# Patient Record
Sex: FEMALE | Race: OTHER
[De-identification: names, ages, dates, MRNs, and addresses within clinical notes are randomized per-mention and may not be internally consistent; named-entity substitution may affect disease eponyms.]

---

## 2019-01-21 ENCOUNTER — HOSPITAL ENCOUNTER (OUTPATIENT)
Dept: HOSPITAL 46 - DS | Age: 54
Discharge: HOME | End: 2019-01-21
Attending: UROLOGY
Payer: COMMERCIAL

## 2019-01-21 VITALS — BODY MASS INDEX: 21.97 KG/M2 | WEIGHT: 139.99 LBS | HEIGHT: 67 IN

## 2019-01-21 DIAGNOSIS — N13.5: ICD-10-CM

## 2019-01-21 DIAGNOSIS — N30.80: ICD-10-CM

## 2019-01-21 DIAGNOSIS — Z79.899: ICD-10-CM

## 2019-01-21 DIAGNOSIS — F41.9: ICD-10-CM

## 2019-01-21 DIAGNOSIS — N20.1: Primary | ICD-10-CM

## 2019-01-21 DIAGNOSIS — B96.20: ICD-10-CM

## 2019-01-21 DIAGNOSIS — I10: ICD-10-CM

## 2019-01-21 DIAGNOSIS — E03.9: ICD-10-CM

## 2019-01-21 PROCEDURE — C2617 STENT, NON-COR, TEM W/O DEL: HCPCS

## 2019-01-21 PROCEDURE — 0T778DZ DILATION OF LEFT URETER WITH INTRALUMINAL DEVICE, VIA NATURAL OR ARTIFICIAL OPENING ENDOSCOPIC: ICD-10-PCS | Performed by: UROLOGY

## 2019-01-21 NOTE — NUR
01/21/19 0826 Stephane,Kesha
0815 PT ARRIVED TO PACU ON 6L VIA MASK, RESP EVEN AND UNLABORED. PT
NONAROUSABLE TO TACTILE STIMULI. SMALL AMOUNT OF SNORING NOTED.
 
0821 PT WOKE TO TACTIEL STIMULI AND STARTS MOANING, PT REPORT "LITTLE
BIT" OF PAIN. PT BACK TO SLEEP. PT REORIENTED TO PACU.
 
0826 O2 REMOVED O2 SAT 99%.

## 2019-01-21 NOTE — NUR
IN TO CHECK ON PT, PT STATES SHE IS FEELING BETTER. CRACKERS GIVEN. NO FURTHER
NEEDS AT THIS TIME.  AT BEDSIDE, CALL LIGHT IN REACH.

## 2019-01-21 NOTE — NUR
1040 IN TO CHECK ON PT, VITALS STABLE. PUDDING GIVEN PER REQUEST. DISCUSSED
DISCHARGE WITH  TRANSLATING FOR PT. PT WILL CALL WHEN READY.

## 2019-01-21 NOTE — NUR
0940 IN TO CHECK ON PT, TOLERATING PO WELL. PT UP WITH ASSIST FROM RN TO
BATHROOM. PT DIZZY AND SLIGHTLY NAUSEATED. ASSISTED BACK TO BED. WATER GIVEN.
OFFERED CRACKERS, PT DECLINED. DISCUSSED TAKING PO PAIN MEDICATION WHEN PT IS
NOT NAUSEATED. WATER REFILLED.  TRANSLATING AT BEDSIDE. CALL LIGHT IN
REACH.

## 2019-01-22 NOTE — OR
[facility]
                                    [address]
                                  [place], [place]  [address]
_________________________________________________________________________________________
                                                                 Signed   
 
 
DATE OF OPERATION:
01/21/2019
 
SURGEON:
Ashley Farmer MD
 
PREOPERATIVE DIAGNOSES:
1. An 11 mm left ureteropelvic junction calculus.
2. Extended-spectrum beta-lactamase urinary tract infection.
 
POSTOPERATIVE DIAGNOSES:
1. An 11 mm left ureteropelvic junction calculus.
2. Extended-spectrum beta-lactamase urinary tract infection.
 
PROCEDURES PERFORMED:
1. Diagnostic cystoscopy.
2. Insertion of an indwelling left ureteral stent.
 
ANESTHESIA:
General.
 
ESTIMATED BLOOD LOSS:
None.
 
COMPLICATIONS:
None.
 
SPECIMENS:
None.
 
DRAINS:
A 6 x 26 cm contour double-J ureteral stent inserted into the left collecting system.
 
INDICATIONS FOR PROCEDURE:
"Sivan" is a very pleasant 53-year-old female with past medical history significant for
nephrolithiasis and recurrent urinary tract infection.  She is a former patient of Dr. Heller, who performed ureteroscopy with left ureteral stone extraction a couple of years
ago.  Dr. Heller determined that the cause of her recurrent stones on the left side is due
to a left ureteropelvic junction obstruction, which has since been confirmed on repeat
imaging studies.  The patient recently presented to me for evaluation of an 11 mm left
renal calculus.  I explained to the patient that I would be glad to extract the
calculus.  However, she will continue to have repeated issues with stones and infection
 
    Electronically Signed By: ASHLEY FARMER MD  01/22/19 1921
_________________________________________________________________________________________
[facility]     [facility] [id number]            OPERATIVE REPORT              
          ACCT #: [id number]  
DATE OF BIRTH:   [birth year]            REPORT #: [id number]      
PHYSICIAN:        ASHLEY FARMER MD               
PCP:              BRAYDEN WAGGONER MD             
REPORT IS CONFIDENTIAL AND NOT TO BE RELEASED WITHOUT AUTHORIZATION
 
 
                                  [facility]
                                    [address], [place]  [address]
_________________________________________________________________________________________
                                                                 Signed   
 
 
until she undergoes definitive management of her left ureteropelvic junction
obstruction.  Dr. Heller did attempt to dilate the UPJ obstruction.  However, the UPJ
obstruction did not fully resolve.  Three days ago, her urine culture results came back
revealing that she had an ESBL E. coli UTI.  Unfortunately, she was unable to obtain the
antibiotics in time for today's procedure.  The decision was, therefore, made for her to
undergo cystoscopy with left ureteral stent insertion today, in preparation for
definitive left ureteroscopy with laser lithotripsy, basket extraction of stones, and
left ureteral stent exchange next week.  The patient would prefer to have the stone
removed now and then be sent out for referral to be evaluated for robotic left
pyeloplasty for definitive management of her left ureteropelvic junction obstruction. 
 
OPERATIVE FINDINGS:
1. On cystoscopy, there was no evidence of any suspicious masses, lesions, or stones.
There is a diffuse, but mild amount of cystitis cystica present.  Bilateral ureteral
orifices are in their normal anatomic location.  There is no evidence of obvious
cystocele or other pelvic organ prolapse on exam. 
2. A 6 x 26 cm double-J ureteral stent was inserted into the left ureter under direct
visualization without difficulty. 
 
DESCRIPTION OF PROCEDURE:
After informed consent was obtained, the patient was taken back to the operating room.
She was transferred from the [facility] to the operating room table, where general
anesthesia was induced.  She was placed in the dorsal lithotomy position and her
genitalia prepped and draped in a standard sterile fashion.  Using a 30-degree lens on a
22.5-Dominican introducer, rigid cystoscope was inserted through the urethra into her
bladder under direct visualization.  Panendoscopic views of the bladder were then
obtained including the lateral walls, floor, dome, and trigone areas.  Please see above
findings.  Attention was then turned to the left ureteral orifice.  A 0.035 Sensor wire
was inserted into the left UO and up into the left collecting system.  There was a
moderate amount of debris that drained from the left collecting system with the
insertion of the Sensor wire, which is indicative of pyuria.  With the wire in adequate
position via fluoroscopy, a 6 x 26 stent was passed over the wire and into the left
collecting system under direct visualization.  The wire was pulled and a loose coil was
seen in the left collecting system along with an adequate coil within the bladder on
cystoscopy.  The patient's bladder was then drained and the cystoscope was removed.  The
procedure was then terminated.  The patient tolerated the procedure well without any
complication.  She will now be transferred to the Postanesthesia Care Unit in stable
condition. 
 
DISPOSITION:
I discussed the details of the procedure with her daughter today and answered all of her
questions.  Her daughter has been instructed to go  the Augmentin that is waiting
 
    Electronically Signed By: ASHLEY FARMER MD  01/22/19 1921
_________________________________________________________________________________________
PATIENT NAME:     TRAVON BLAKEVIGES           
MEDICAL RECORD #: [id number]            OPERATIVE REPORT              
          ACCT #: [id number]  
DATE OF BIRTH:   [birth year]            REPORT #: [id number]      
PHYSICIAN:        ASHLEY FARMER MD               
PCP:              BRAYDEN WAGGONER MD             
REPORT IS CONFIDENTIAL AND NOT TO BE RELEASED WITHOUT AUTHORIZATION
 
 
                                  [address]  [address]
_________________________________________________________________________________________
                                                                 Signed   
 
 
for her at the [facility] in [place] for her to begin taking this afternoon and for the
rest of the week.  Her recent culture showing the ESBL E. coli UTI shows that this
organism is sensitive to Augmentin.  She will be scheduled to return to the operating
room in exactly one week to undergo left ureteroscopy, laser lithotripsy, basket
extraction of stones, and left ureteral stent exchange. 
 
 
 
            ________________________________________
            MD MAYO Flores/JOSE JUANL
Job #:  [id number]
DD:  01/21/2019 08:50:18
DT:  01/21/2019 17:59:34
 
 
Copies:                                
~
 
 
 
 
 
 
 
 
 
 
 
 
 
 
 
 
 
 
 
 
 
 
 
    Electronically Signed By: ASHLEY FARMER MD  01/22/19 1921
_________________________________________________________________________________________
PATIENT NAME:     EMY LINDA GUARDADO           
MEDICAL RECORD #: [id number]            OPERATIVE REPORT              
          ACCT #: [id number]  
DATE OF BIRTH:   [birth year]            REPORT #: [id number]      
PHYSICIAN:        ASHLEY FARMER MD               
PCP:              BRAYDEN WAGGONER MD             
REPORT IS CONFIDENTIAL AND NOT TO BE RELEASED WITHOUT AUTHORIZATION

## 2019-01-28 ENCOUNTER — HOSPITAL ENCOUNTER (OUTPATIENT)
Dept: HOSPITAL 46 - OPS | Age: 54
Discharge: HOME | End: 2019-01-28
Attending: UROLOGY
Payer: COMMERCIAL

## 2019-01-28 VITALS — BODY MASS INDEX: 21.97 KG/M2 | HEIGHT: 67 IN | WEIGHT: 139.99 LBS

## 2019-01-28 DIAGNOSIS — Z87.442: ICD-10-CM

## 2019-01-28 DIAGNOSIS — N39.0: ICD-10-CM

## 2019-01-28 DIAGNOSIS — B96.29: ICD-10-CM

## 2019-01-28 DIAGNOSIS — E03.9: ICD-10-CM

## 2019-01-28 DIAGNOSIS — N13.5: ICD-10-CM

## 2019-01-28 DIAGNOSIS — Z79.899: ICD-10-CM

## 2019-01-28 DIAGNOSIS — N20.0: Primary | ICD-10-CM

## 2019-01-28 DIAGNOSIS — F41.9: ICD-10-CM

## 2019-01-28 DIAGNOSIS — Z98.890: ICD-10-CM

## 2019-01-28 DIAGNOSIS — I10: ICD-10-CM

## 2019-01-28 DIAGNOSIS — Z79.2: ICD-10-CM

## 2019-01-28 PROCEDURE — 0TP98DZ REMOVAL OF INTRALUMINAL DEVICE FROM URETER, VIA NATURAL OR ARTIFICIAL OPENING ENDOSCOPIC: ICD-10-PCS | Performed by: UROLOGY

## 2019-01-28 PROCEDURE — BT1FYZZ FLUOROSCOPY OF LEFT KIDNEY, URETER AND BLADDER USING OTHER CONTRAST: ICD-10-PCS | Performed by: UROLOGY

## 2019-01-28 NOTE — NUR
01/28/19 1312 Kesha Calderón
1300 PT ARRIVED TO PACU ON 6L VIA MASK, RESP EVEN AND UNLABORED. PT
REACTIVE TO VERBAL STIMULI.
 
1307 O2 MASK REMOVED.
 
1311 PT REPORTS 2/10 PAIN AND TOLERABLE AT THIS TIME, PT SPITTING UP
SPUTUM AND SUCTION USED. PT CONTINUES TO REST IN BED WITH EYES CLOSED.

## 2019-01-28 NOTE — NUR
PT INDICATES THAT SHE WOULD LIKE TO GO HOME AT THIS TIME. SHE HAS MET DC
CRITERIA. SHE HAS BEEN ABLE TO KEEP JELLO AND WATER DOWN WITHOU ISSUES. SHE I
STILL C/O PHLEGM IN HER CHEST, IT IS SUGGESTED THAT THEY GET OVER THE COUNTER
MUCINEX TO HELP THIN IT OUT SO SHE CAN COUGH IT UP. NO ADDITIONAL NEEDS AT THI
TIME. WILL GET THE PT READY TO GO HOME.

## 2019-01-28 NOTE — NUR
PT IS BACK TO DS FROM PACU. DR. FARMER IS IN THE ROOM TALKING WITH FAMILY.
 AND DAUGHTER ARE AT THE BEDSIDE. CALL LIGHT WITHIN IN REACH. NO
ADDIIONAL NEEDS AT THIS TIME. WILL CONINUE TO MONITOR.

## 2019-01-28 NOTE — NUR
PT REPORTS BEING IN A "LITTLE PAIN" RAITING IT A 5 OR 6 OUT OF 10. SHE IS
ASKED IF SHE WOULD LIKE ANYTHING FOR THE PAIN, SHE STATES THAT SHE DOESN'T
WANT ANYTHING RIGHT NOW. SHE IS GIVEN ICE WATER.

## 2019-01-28 NOTE — NUR
LE 1430: PT REQUESTING TO GET UP AND USE THE RESTROOM. PT IS DIZZY UPON
SITTING UP ON THE EDGE OF THE BED. BEDSIDE COMMODE IS BROUGHT INTO THE ROOM.
SHE IS ABLE TO VOID QS, YELLOW/PINKISH URINE. SHE REPORTS SOME PAIN IN HER
LOWER ABDOMEN, BUT DOES NOT WANT PAIN MEDICINE AT THIS TIME. THE PT IS C/O
FEELING LIKE SHE HAS A LOT OF PHLEGM STUCK IN HER THROAT, SHE IS GIVEN AN
EMESIS BACK TO SPIT IN. SHE STATES THAT IT FEELS BETTER WHEN SHE IS SITTING
UP, SHE IS CURRENTLY SITTING ON THE EDGE OF THE BED.  IS AT THE
BEDSIDE. CALL LIGHT WITHIN REACH. NO ADDITIONAL NEEDS AT THIS TIME. PT STATES
THAT HER DIZZINESS IS SUBSIDING AT THIS TIME. WILL CONTINUE TO MONITOR.

## 2019-01-30 NOTE — OR
[facility]
                                    [address] [place], [place]  [address]
_________________________________________________________________________________________
                                                                 Signed   
 
 
DATE OF OPERATION:
01/28/2019
 
SURGEON:
Ashley Farmer MD
 
PREOPERATIVE DIAGNOSES:
1. Recurrent urinary tract infection.
2. Recent active extended spectrum beta-lactamase Escherichia coli urinary tract
infection. 
3. Known 1.2 cm left renal calculus.
4. History of left ureteropelvic junction obstruction.
 
POSTOPERATIVE DIAGNOSES:
1. Recurrent urinary tract infection.
2. Recent active extended spectrum beta-lactamase Escherichia coli urinary tract
infection. 
3. Known 1.2 cm left renal calculus.
4. History of left ureteropelvic junction obstruction.
5. Severe, tortuous, left ureteropelvic junction obstruction.
 
NAMES OF PROCEDURES:
1. Diagnostic cystoscopy with left ureteral stent extraction.
2. Left retrograde pyelogram.
 
ANESTHESIA:
General.
 
ESTIMATED BLOOD LOSS:
None.
 
COMPLICATIONS:
None.
 
SPECIMENS:
None.
 
DRAINS:
None.
 
INDICATIONS FOR PROCEDURE:
"Sivan" is a very pleasant 53-year-old female with a history of nephrolithiasis and
 
    Electronically Signed By: ASHLEY FARMER MD  01/30/19 1331
_________________________________________________________________________________________
PATIENT NAME:     EMY LINDA GUARDADO           
MEDICAL RECORD #: [id number]            OPERATIVE REPORT              
          ACCT #: [id number]  
DATE OF BIRTH:   [birth year]            REPORT #: [id number]      
PHYSICIAN:        ASHLEY FARMER MD               
PCP:              BRAYDEN WAGGONER MD             
REPORT IS CONFIDENTIAL AND NOT TO BE RELEASED WITHOUT AUTHORIZATION
 
 
                                  [facility]
                                    [address] [place] [address]
                                  [place], [place]  [address]
_________________________________________________________________________________________
                                                                 Signed   
 
 
recurrent urinary tract infections.  She presented to me in late 2018 after being found
to have a 1.2 cm stone in the left renal pelvis.  She has a history of nephrolithiasis
and underwent an endopyelotomy by Dr. Heller in approximately 2017.  She has been
experiencing repeated stones within the left kidney, likely secondary to her
ureteropelvic junction obstruction.  She recently presented requesting extraction of her
stone as it does cause her discomfort.  Since her request for surgery, I have treated
her urine multiple times for ESBL Escherichia coli.  She was initially slated to undergo
surgery about a week ago; however, she was unable to take her oral antibiotics in time
and instead she underwent left ureteral stent insertion.  She presents today to undergo
definitive management of her stone.  She understands that she will continue to make
stones until her ureteropelvic junction obstruction is repaired.  Despite this, she is
interested in having the kidney stone removed. 
 
OPERATIVE FINDINGS:
1. The patient's indwelling left ureteral stent was removed fully intact today.
2. A left retrograde pyelogram was performed, which revealed a very tight and tortuous
ureteropelvic junction obstruction that was approximately 2-3 cm in length.  I was
unable to pass the ureteroscope into this very tight dense scar tissue.  Multiple
attempts were made to pass a 0.035 Sensor wire through the stricture and up into the
left renal pelvis.  However, this was also unsuccessful.  Unfortunately, at that time I
did not have any glide wires or any other smaller wires to attempt to pass through the
obstruction and into the left renal pelvis.  I was therefore unable to successfully pass
the stent back into the left collecting system as I would have wished to do. 
3. Also noted on the left retrograde pyelogram was definite dilation of the left renal
pelvis along with blunting of the calices, consistent with chronic diminished drainage
on the left side due to this ureteropelvic junction obstruction.  It appears as though
the endopyelotomy that was performed a few years ago by Dr. Heller has failed.  Left
retrograde pyelogram does reveal the large filling defect consistent with her large 1.2
cm stone within the left renal pelvis. 
 
DESCRIPTION OF PROCEDURE:
After informed consent was obtained, the patient was taken back to the operating room.
She was transferred from the [facility] to the operating room table, where general
anesthesia was induced.  She was placed in the dorsal lithotomy position and her
genitalia prepped and draped in the standard sterile fashion.  Using a 30-degree lens on
a 22.5 French introducer, rigid cystoscope was inserted through urethra into her bladder
under direct visualization.  Panendoscopic views of bladder were then obtained.  The
current indwelling left ureteral stent was removed fully intact.  I then advanced a
cone-tipped catheter to the left ureteral orifice and a left retrograde pyelogram was
performed.  Please see above findings.  It was then that I noted that the severity of
her ureteropelvic junction obstruction on the left side and I made several attempts to
pass a 0.035 Sensor wire into the left collecting system, however due to the tortuosity
 
    Electronically Signed By: ASHLEY FARMER MD  01/30/19 1331
_________________________________________________________________________________________
PATIENT NAME:     EMY LINDA GUARDADO           
MEDICAL RECORD #: [id number]            OPERATIVE REPORT              
          ACCT #: [id number]  
DATE OF BIRTH:   [birth year]            REPORT #: [id number]      
PHYSICIAN:        ASHLEY FARMER MD               
PCP:              BRAYDEN WAGGONER MD             
REPORT IS CONFIDENTIAL AND NOT TO BE RELEASED WITHOUT AUTHORIZATION
 
 
                                  [address]  [address]
_________________________________________________________________________________________
                                                                 Signed   
 
 
and stenotic nature of the obstruction, I was unable to pass a Sensor wire.
Unfortunately, I did not have any other types of thin or hydrophilic wires to attempt to
pass into the left renal pelvis.  I therefore aborted my attempts to re-stent the
patient.  Thinking back to my previous stent placement, it was likely that the proximal
coil was placed in the proximal ureter.  The decision was made to not perform a
retrograde pyelogram last week due to the presence of active ESBL E coli infection at
that time.  At this point in time, the decision was made to end the procedure.  The
patient tolerated the procedure well without any complication.  She will now be
transferred to the postanesthesia care unit in stable condition. 
 
DISPOSITION:
I discussed the details of what I saw on retrograde pyelogram today with the patient's
 and her daughter and answered all of her questions.  I have made it very clear
to them that the patient has a rather severe and long proximal ureteral
stricture/ureteropelvic junction obstruction that can only be definitively repaired via
robotic pyeloplasty.  I have recommended that she be sent to Dr. Moses Colón at
[facility] in [place] for definitive repair.  I have asked that a formal
referral be placed for this purpose.  In the interim, she will be given antibiotics as
needed for recurrent ESBL UTIs.  I have made it clear to the family today that she will
continue to have recurrent UTIs and flank pain on the left side until her UPJ
obstruction can be formally repaired.  At that time, during the robotic pyeloplasty the
surgeon will be able to extract the stone from the left renal pelvis so that both issues
can be corrected at once.  The patient's family verbalized understanding of this today
and agree with the current plan of care.  She will be scheduled to follow up with me in
approximately six weeks in clinic for urine check and to monitor her discomfort.  In the
interim, she will be seen by Dr. Colón to be evaluated for robotic pyeloplasty on
the left side. 
 
 
 
            ________________________________________
            MD MAYO Flores/ANTONETTE
Job #:  [id number]
DD:  01/29/2019 19:53:59
DT:  01/29/2019 23:26:13
 
 
Copies:                                
 
 
    Electronically Signed By: ASHLEY FARMER MD  01/30/19 1331
_________________________________________________________________________________________
PATIENT NAME:     LINDA BLAKE           
MEDICAL RECORD #: [id number]            OPERATIVE REPORT              
          ACCT #: [id number]  
DATE OF BIRTH:   [birth year]            REPORT #: [id number]      
PHYSICIAN:        ASHLEY FARMER MD               
PCP:              BRAYDEN WAGGONER MD             
REPORT IS CONFIDENTIAL AND NOT TO BE RELEASED WITHOUT AUTHORIZATION
 
 
                                  [address]  [address]
_________________________________________________________________________________________
                                                                 Signed   
 
 
~
 
 
 
 
 
 
 
 
 
 
 
 
 
 
 
 
 
 
 
 
 
 
 
 
 
 
 
 
 
 
 
 
 
 
 
 
 
 
 
 
 
 
    Electronically Signed By: ASHLEY FARMER MD  01/30/19 1331
_________________________________________________________________________________________
PATIENT NAME:     EMY LINDA GUARDADO           
MEDICAL RECORD #: [id number]            OPERATIVE REPORT              
          ACCT #: [id number]  
DATE OF BIRTH:   [birth year]            REPORT #: [id number]      
PHYSICIAN:        ASHLEY FARMER MD               
PCP:              BRAYDEN WAGGONER MD             
REPORT IS CONFIDENTIAL AND NOT TO BE RELEASED WITHOUT AUTHORIZATION

## 2019-07-02 ENCOUNTER — HOSPITAL ENCOUNTER (EMERGENCY)
Dept: HOSPITAL 46 - ED | Age: 54
Discharge: HOME | End: 2019-07-02
Payer: COMMERCIAL

## 2019-07-02 VITALS — WEIGHT: 134.99 LBS | BODY MASS INDEX: 21.19 KG/M2 | HEIGHT: 67 IN

## 2019-07-02 DIAGNOSIS — Z79.899: ICD-10-CM

## 2019-07-02 DIAGNOSIS — E03.9: ICD-10-CM

## 2019-07-02 DIAGNOSIS — B34.9: Primary | ICD-10-CM

## 2019-07-02 DIAGNOSIS — I10: ICD-10-CM

## 2019-07-02 DIAGNOSIS — Z90.710: ICD-10-CM

## 2019-07-02 DIAGNOSIS — Z87.442: ICD-10-CM
